# Patient Record
(demographics unavailable — no encounter records)

---

## 2024-12-05 NOTE — ASSESSMENT
[FreeTextEntry1] : 64-year-old woman with longstanding hypertension and type 2 diabetes, which led to stage IV-V CKD -it appeared that she was headed to dialysis last year, but has improved slightly and stabilized to some degree.  Secondary hyperparathyroidism has improved, and microalbuminuria has regressed to normal.  However her blood sugar remains poorly controlled with an A1c above 9.  I told her she could go ahead and start the diabetic medicines that were given to her at [facility] 3 months ago.  She also takes Lantus insulin 30 units in the morning and 30 at night.  She no longer overeats the way she used to in the past.  I have instructed her regarding Lokelma.  I have ordered labs to be repeated in 3 months to include A1c, BMP, Cystatin C, UACR, PTH.  She is not dizzy or lightheaded despite being relatively hypotensive.  Time spent 45 minutes

## 2024-12-05 NOTE — PHYSICAL EXAM
[General Appearance - Alert] : alert [General Appearance - In No Acute Distress] : in no acute distress [Sclera] : the sclera and conjunctiva were normal [PERRL With Normal Accommodation] : pupils were equal in size, round, and reactive to light [Outer Ear] : the ears and nose were normal in appearance [Neck Appearance] : the appearance of the neck was normal [Neck Cervical Mass (___cm)] : no neck mass was observed [Jugular Venous Distention Increased] : there was no jugular-venous distention [Heart Rate And Rhythm] : heart rate was normal and rhythm regular [Heart Sounds] : normal S1 and S2 [Heart Sounds Gallop] : no gallops [Heart Sounds Pericardial Friction Rub] : no pericardial rub [Skin Color & Pigmentation] : normal skin color and pigmentation [Skin Turgor] : normal skin turgor [] : no rash [No Focal Deficits] : no focal deficits [Oriented To Time, Place, And Person] : oriented to person, place, and time [Impaired Insight] : insight and judgment were intact [Affect] : the affect was normal

## 2024-12-05 NOTE — HISTORY OF PRESENT ILLNESS
[FreeTextEntry1] : 64-year-old woman with a number of complex medical problems including type 2 diabetes and hypertension for 30 years, which led to kidney failure.  Her creatinine actually peaked at 3.8 last year, and was 3.1 last July, but currently only 2.85, with a BUN of 47, GFR of 18 by creatinine, and 23 by Cystatin C.  Her sugars poorly controlled, currently 261 with an A1c of 9.2, but it has dropped from 10.1.  PTH is well-controlled, having dropped from 137 down to 71.  Hemoglobin is stable at 11.1.  Fortunately, microalbuminuria has regressed to normal on lisinopril 20 mg daily.  Metabolic acidosis is controlled with sodium bicarb, and her K is 3.7 with a CO2 of 23.  She remains on calcitriol which is what has reduced her PTH.  She is still taking Lokelma 10 g 3 times a week and I have explained that with her potassium now down to 3.6, we can hold it for 3 weeks and then resume it once a week.  While her sugar has improved, it is still suboptimal.  She saw diabetologist at [facility] in late September whom she liked, and gave her Januvia 25 mg and pioglitazone 15 mg, neither of which she has started yet.  Treatment someone else wanted to give her Trulicity but she declined.  She has neuropathy in her feet, not surprisingly.  She is due for colonoscopy and mammogram shortly.  She is highly opposed to generic medications and prefers brands, and is actually willing to pay for them.  She has not required Retacrit.

## 2024-12-16 NOTE — PHYSICAL EXAM
[Normal] : affect was normal and insight and judgment were intact [de-identified] : Pain in her knees.  She is not interested in synovial injections.

## 2024-12-16 NOTE — DATA REVIEWED
[FreeTextEntry1] : I reviewed patient's blood work which included CBC, CMP, statin level and hemoglobin A1c.  Hemoglobin A1c was greater than 9

## 2024-12-16 NOTE — REVIEW OF SYSTEMS
[Negative] : Psychiatric [FreeTextEntry3] : Patient has not been to an ophthalmologist within the last year [FreeTextEntry9] : Patient had a bad fall last year and has arthritis in her lower extremities.

## 2024-12-16 NOTE — HISTORY OF PRESENT ILLNESS
[FreeTextEntry1] : Patient is here to establish care and for follow-up of multiple chronic conditions [de-identified] : Patient has insulin-dependent type 2 diabetes and severe chronic kidney disease.  She follows with a nephrologist and takes lisinopril 10 mg 2 tablets daily, sodium bicarbonate for acidosis, Calcitrol for hyperparathyroidism and low Dayna for hyperkalemia. Patient saw a endocrinologist at [facility] in September but did not follow the recommendations and will not be going back to see her.  Currently the patient takes 30 units of glargine twice a day.  She is opposed to taking GLP-1 agonists as she had a bad experience with Ozempic in the past.  She also takes NovoLog 4 units AC. Patient is scheduled for colonoscopy and mammogram in the very near future. Patient takes rosuvastatin 20 mg 2 tablets daily for hypercholesterolemia. Patient has had her flu vaccine this year and pneumonia vaccine 3 years ago.  She has not had shingles.

## 2024-12-16 NOTE — ASSESSMENT
[FreeTextEntry1] : . #Insulin requiring diabetes Not controlled: Patient is not under good control.  She would benefit from a continuous glucose monitor but she is not interested at this time.  She is willing to see an endocrinologist so I will refer her.  In the meantime I have instructed her to increase her daily glargine by 2 units if her fasting blood sugar is greater than 130 and recheck every 3 days and repeat.  She can continue with 4 units of NovoLog with meals.  I have also referred her to ophthalmology since she has not had an eye exam within the last year.  #Chronic kidney disease: Will continue her current medications which include lisinopril 10 mg 2 tablets daily as well as sodium bicarbonate, Calcitrol and low Dayna as recommended by her nephrologist.  Patient states she modulates her lisinopril dose at 10 or 20 mg daily based upon her morning blood pressure.  I advised her to take 10 mg daily and if her blood pressure is greater than 110 systolic, to increase to 20 mg daily.  #Hyperlipidemia: Continue rosuvastatin 20 mg 2 tablets daily.  Check lipid panel today  #Health maintenance: Patient is scheduled for colonoscopy and mammography in the next few weeks.  Will administer shingles vaccine today.  Will see patient back in the office in 3 months.

## 2025-01-16 NOTE — ASSESSMENT
[FreeTextEntry1] : . #Wound from fall: I advise patient that she needs to have this wound evaluated by a physician before prescribing an antibiotic. I offered her an appointment in the practice today with a colleague but she does not want to travel. She states instead she would rather go to a local urgent care center. I will follow up with her after her appointment by phone.

## 2025-01-16 NOTE — HISTORY OF PRESENT ILLNESS
[FreeTextEntry8] : This was an attempted Televisit But patient was unable to access the video capabilities of her phone. Patient states she fell on January 4th landing on her knees. She tripped over an extension cord in her apartment and did not lose consciousness. She states she has a U shaped wound on her right knee that is mostly closed but still open in one corner. She says the area is somewhat painful and tender. She is concerned there may be an infection. There is no drainage. She has no fever and states the area is not particularly red. Patient is requesting an antibiotic. Patient states her blood sugar is under better control with fasting blood sugars averaging 120.

## 2025-02-05 NOTE — ASSESSMENT
[FreeTextEntry1] : 64-year-old woman with a long history of hypertension and type 2 diabetes, with stage IV CKD, but quite significant improvement over the last 4 blood draws.  I do not know what happened with her Lantus insulin but we have disposed of the 1 she thought was bad.  We have ordered insulin needles for her, as well as a new walker and commode.  She will continue on lisinopril 30 mg/day.  I asked her to hold off on Lokelma for 3 weeks, and then only take it 1 time per week because her K was 4.2.  I am very pleased with the improvement in her renal function.  I checked her home BP cuff which was virtually identical to our reading, with her's reading 124/71 and hours 122/70.  She will return in 2 months preceded by labs again.  Time spent 45 minutes

## 2025-02-05 NOTE — HISTORY OF PRESENT ILLNESS
[FreeTextEntry1] : 64-year-old woman with a long history of type 2 diabetes and hypertension for over 30 years, which led to diabetic nephropathy and kidney failure.  Her creatinine actually peaked at 3.8 last year, but has steadily dropped down to 3.1 last July, then 2.85, and now down to 2.62, with a BUN of 43, GFR 20 by creatinine but 27 by Cystatin C, K4.2, CO2 24, A1c 7.7.  Urine microalbumin has actually normalized with the use of lisinopril.  Her PTH has improved on calcitriol.  She has used Lokelma and it was reduced from 3 times a week to once a week when her potassium felt dramatically.  However she has been taking it twice a week.  She fell recently scraping and bruising her right knee.  She had a mammogram recently but has not yet done her colonoscopy.  She insists that she had a reaction to Lantus insulin and had the drugstore give her a new order and asked us to dispose of the 1 that caused her to feel poorly.  She has a home BP device which I found to be very accurate.  She has had 3 falls in total since I saw her last.  She is requesting a new potty chair/commode and a new walker.  She also asked us to her order insulin needles called Unifine 0.25 x 8 mm.  She is taking lisinopril 30 mg/day, 20 in the morning and 10 at night.

## 2025-02-06 NOTE — HISTORY OF PRESENT ILLNESS
[FreeTextEntry8] : Ms. CHRIS HERMOSILLO is a 64-year-old female with history of T2DM, CKD 4, OA, HTN, HLD presenting today for ~  ~

## 2025-03-12 NOTE — HISTORY OF PRESENT ILLNESS
[FreeTextEntry1] : Patient is here for follow-up of diabetes, hypertension, hypercholesterolemia and CKD. Patient recently saw her nephrologist who obtained blood work.  Patient is taking lisinopril 20 mg a day for hypertension.  She states that time she takes 30 mg a day if the blood pressure is high.  I cautioned her that she should take the same dose every day and if her blood pressure is persistently high, only then would be increase the dose of lisinopril. Patient is on rosuvastatin 20 mg 2 tablets daily.  She continues with Lantus 30 units twice a day.  She states her blood sugars in the morning is consistently less than 130.  She is also using NovoLog 4 units with meals. Recent laboratory test showed that her LDL is at goal at less than 100 mg/dL and her hemoglobin A1c has come down nicely to 7.7%. Patient had a mammogram on January 2025 which was normal.  She is in the process of scheduling her colonoscopy. She will receive part 2 of her shingles vaccines today.  She reports that she is up-to-date on tetanus, and pneumococcal vaccine. Patient has had several falls since last visit.  She walks with a walker.  Each time she appears to stumble on something.

## 2025-03-12 NOTE — REVIEW OF SYSTEMS
[FreeTextEntry7] : Patient has occasional indigestion and she is requesting a refill on calcium carbonate liquid 125 mg per 5 mL.

## 2025-03-12 NOTE — PHYSICAL EXAM
[TextEntry] : Constitutional: Patient is well nourished, well appearing and in no distress Pulmonary: No respiratory distress chest is clear Cardiac exam reveals S1 and S2 are normal. Neuro: Speech normal, alert and oriented Psychiatric: Normal mood, normal insight/judgement, normal affect

## 2025-03-20 NOTE — ASSESSMENT
[Long Term Vascular Complications] : long term vascular complications of diabetes [Carbohydrate Consistent Diet] : carbohydrate consistent diet [Importance of Diet and Exercise] : importance of diet and exercise to improve glycemic control, achieve weight loss and improve cardiovascular health [Hypoglycemia Management] : hypoglycemia management [Action and use of Insulin] : action and use of short and long-acting insulin [Self Monitoring of Blood Glucose] : self monitoring of blood glucose [Injection Technique, Storage, Sharps Disposal] : injection technique, storage, and sharps disposal [FreeTextEntry1] : 1) DM2: Uncontrolled, A1C  target of <7.5%. Natural hx of the disease and importance of treatment targets discussed at length, she verbalized understanding. ADA diet and importance of exercise discussed at length. Pt is very reluctant to make any changes to her insulin regimen as she has grown accustomed to her hypoglycemia and frequent eating. explained the potential high risk and toxicities with chronic insulin use including, but not limited to life threatening hypoglycemia and death, Verbalized understanding and agrees with treatment plan, will contact MD and seek emergency medical care if condition changes. Plan is to immediately reduce Lantus to 40 units daily w/ further titration  to 30 units if pt continues to experience hypoglycemia while fasting, advised on increasing bolus insulin to 8 units before all meals but she remains very hesitant, is aggreeable to try 6 units TIDAC 1st. titration instructions provided. Refer to Nutritionist today. We prateek check microalbumin, lipids and labs on the NV. Discuss vaccines and podiatry/opthalmology referrals on NV    2) Essential HTN: Pt is at goal BP and on an ACE inh. Reassess microalbumin prior to the NV.   3) Dyslipidemia: Pt is on a moderate intensity statin. REassess lipids on the next visit. LDL target <100.

## 2025-03-20 NOTE — HISTORY OF PRESENT ILLNESS
[FreeTextEntry1] : 65 y/o F w/ Hx of DM2 HTN HLD CKD stage IV here for initial evaluation and management of diabetes generally feels well and endorses no acute complaints. reports diagnosis ~ 8 y/a, has always been on insulin, reports fair control over the years, aware of micro vasc damage. past insulin use include humulin, NPH. most recently she reports self titrating basal bolus regimen (basaglar/novolog). has not seen Endocrine in >10 years. reports she injects w/ Lantus 30 units BID, but only uses novolo 4 units with some meals (often skips, sometimes uses after meals). reports frequent daily hypoglycemia w/ FSG ~30, no clear trigger or warning sign. she believes they happen most often if she fasts for more than 6 hours, so she constantly grazes w/ food and consumes OJ/sugar gel for treatment if she feels a HA. she monitors her sugar almost 6 dimitri daily.. recent A1Cs have risen. admits to sedentary lifestyle and worsening dietary indiscretions. post prandial readings do consistently show FSG ~200-250. also takes jardiance 25 mg per Renal. She otherwise denies any f/c, CP, SOB, palpitations, tremors, depressed mood, anxiety, palpitations, n/v, stool/urinary abn, skin/weight changes, heat/cold intolerance, HAs, breast/nipple changes, polyuria/polydipsia/nocturia or other complaints.

## 2025-05-02 NOTE — ASSESSMENT
[FreeTextEntry1] : 1. T2D + CKD A1C goal <7% A1C - 8% (4/1/25) from 7.7% (2/3/25) from 9.2% (12/2024) from 10.1% (9/2024) from 8.5% (3/2024) Current regimen: Lantus 30 units BID, Novolog 5 units TIDAC Glucometer readings at home reveal fasting and postprandial BG at or close to goal Glucometer reading performed in office today is above goal postprandially   Veronica endorses increasing insulin aspart to 5 units with improving sugars more often at or closer to goal range.  She is fearful for hypoglycemia and prefers sugars to not go <100.  She has never used CGM and today will consider further from discussion on CGM ability to provide alerts to prevent hypos. -- continue Lantus 30 units daily -- continue insulin aspart 5 units TIDAC, if higher starch meal, can add 1 unit  Follow-up in 3-4 months with Miri Cobian NP Continue plan to establish with Dr Lal, given preference in October 2025  Neisha Wells, MS, FN-BC, [facility] 05/01/2025

## 2025-05-02 NOTE — PHYSICAL EXAM
[Alert] : alert [No Acute Distress] : no acute distress [Normal Voice/Communication] : normal voice communication [No Respiratory Distress] : no respiratory distress [Normal Rate and Effort] : normal respiratory rate and effort [Normal Rate] : heart rate was normal [Oriented x3] : oriented to person, place, and time [Normal Affect] : the affect was normal [Normal Insight/Judgement] : insight and judgment were intact [Normal Mood] : the mood was normal [de-identified] : Ambulates with walker

## 2025-05-02 NOTE — REVIEW OF SYSTEMS
[Joint Pain] : joint pain [Back Pain] : back pain [Difficulty Walking] : difficulty walking [Negative] : Endocrine [Polyuria] : no polyuria

## 2025-05-02 NOTE — HISTORY OF PRESENT ILLNESS
[FreeTextEntry1] : CHRIS HERMOSILLO is a 65 year old female with pmhx of T2D (dx >30 years ago), CKD, HLD who presents for T2D follow-up.   Previously seen by Dr Obregon, desires establishing care with myself and Dr Lal (planning to see her in the fall) Diabetes previously managed by PCP, Dr Riddle   Interval change: Endorses strong fhx of DM Endorses adherence to MDI regimen.  Occasionally covers snack pending pre-snack sugar Endorses rare hypoglycemia to 60s. No lower than 60 Carries glucose gels Endorses some diet excursions (ice cream by spoonful) Uses [facility] for Test Strips (testing supplies) Uses [facility] for insulin/insulin admin supplies   A1C - 8% (4/1/25) from 7.7% (2/3/25) from 9.2% (12/2024) from 10.1% (9/2024) from 8.5% (3/2024) Most recent GFR by cystatin C from 4/2/25 of 24 Office Fingerstick -- 196 (not fasting)   Current medication: - Lantus 30 units BID - Novolog 5 units TIDAC   Past medication: - Humulin - NPH - metformin - ozempic (disinterested in taking this medication)   CHRIS reports they take their diabetes medication all of the time. CHRIS endorses rare hypoglycemia symptoms or BG <70   Diabetes Self-Management: Glucometer:  Monitoring BG 4x daily BG values verbalized as below --fasting BG range:  130-140 -- after meals low to mid 100s, at times 200, rarely >200   Diet-- Typically consumes ~3 meals/daily, +/- snacks. At times grazes. Beverages: water, diet soda   Nephrologist: Dr Treadwell Ophthalmology: ~1 year, desires new eye doctor Podiatry: ~1 year ago

## 2025-05-12 NOTE — ASSESSMENT
[FreeTextEntry1] : 65-year-old woman with longstanding hypertension and type 2 diabetes, whose renal function has been quite compromised, but has actually improved within the last year.  So has control of diabetes.  Hyperkalemia and metabolic acidosis have come under control with sodium bicarb and Lokelma.  Secondary hyperparathyroidism has come under control with calcitriol.  Lisinopril has helped control microalbuminuria.  She remains very afraid of dialysis, and is understandably motivated to avoid it.  She does not yet need EPO, with a hemoglobin of 10.8.  Time spent 45 minutes

## 2025-05-12 NOTE — HISTORY OF PRESENT ILLNESS
[FreeTextEntry1] : 65-year-old woman with a long history of type 2 diabetes and hypertension for over 3 decades, leading to diabetic nephropathy and kidney failure.  Her creatinine peaked at 3.8 last year, but has remarkably improved, down to 3.1 last July, then 2.85, then 2.62, and although it panchito to 3.23 on April 1 of this year, it is now back down to 2.33, BUN 46, GFR 23-25 depending on method used, K4.8 on serum, but only 4.5 on plasma, CO2 22, no microalbuminuria, PTH has normalized at 56, and A1c has improved down to 7.3.  She remains on solid dose ACE inhibitor, lisinopril 30 mg/day.  She has used calcitriol to normalize PTH.  She has also taken Lokelma to control hyperkalemia.  She is currently taking it twice a week.  She is currently taking it twice a week.  Secondary hyperparathyroidism has been nicely controlled with the use of calcitriol 0.25 mg daily.  Metabolic acidosis and hyperkalemia are relatively well-controlled with sodium bicarb 650 mg, 2 tabs 3 times daily.  She is taking all her medications faithfully.  She continues to be haunted by the possible prospect of dialysis in the future.  Her cousin just passed away after spending years on dialysis.  She has not had any falls recently.  Her appetite is fair, and she eats several small meals rather than 2 or 3 larger ones.  Her blood sugar has improved nicely with the help of endocrinology.  Her BP at home runs 110-120 systolic.  It was 105/66 at her March 12 visit to Dr. Riddle.  We reviewed her current and previous labs together.  She understands how much improvement she is made and is motivated to continue.

## 2025-06-04 NOTE — PHYSICAL EXAM
[Normal] : normal rate, regular rhythm, normal S1 and S2 and no murmur heard [TextEntry] : Constitutional: Patient is well nourished, well appearing and in no distress Conjunctiva are noninjected.  There is no crusting or purulent discharge. Pulmonary: No respiratory distress Neuro: Speech normal, alert and oriented Psychiatric: Normal mood, normal insight/judgement, normal affect

## 2025-06-04 NOTE — HISTORY OF PRESENT ILLNESS
[FreeTextEntry1] : Patient is here for follow-up of hypertension, hypercholesterolemia, chronic kidney disease and diabetes. [de-identified] : Patient is now seeing an endocrinologist who has changed the patient's regimen to Lantus 30 units daily along with short acting insulin before each meal. Patient continues to follow with her nephrologist and her kidney function will be impaired is stable.  She had labs obtained in May.  I reviewed her serum potassium, hemoglobin A1c, basic metabolic panel, Cystatin C, albumin creatinine ratio obtained by other physicians. For blood pressure she continues to take lisinopril 20 mg daily and for hypercholesterolemia she is on rosuvastatin 40 mg daily. Patient complains of itchy eyes.  There is no crusting or purulent discharge. Patient is requesting refill of albuterol inhaler which she uses occasionally for mild asthma
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
## 2025-07-29 NOTE — ASSESSMENT
[FreeTextEntry1] : 65-year-old woman with well-controlled type 2 diabetes and hypertension, stable renal function and stage IV.  She will return in 2 months preceded by labs.  I have asked her to stop Lokelma for 2 weeks and then resume at just 1 time per week.  I have renewed her rosuvastatin at 40 mg daily.